# Patient Record
Sex: MALE | Race: WHITE | URBAN - METROPOLITAN AREA
[De-identification: names, ages, dates, MRNs, and addresses within clinical notes are randomized per-mention and may not be internally consistent; named-entity substitution may affect disease eponyms.]

---

## 2019-01-01 ENCOUNTER — POST-OPERATIVE VISIT (OUTPATIENT)
Dept: URBAN - METROPOLITAN AREA CLINIC 29 | Facility: CLINIC | Age: 74
End: 2019-01-01

## 2019-01-01 PROCEDURE — 99024 POSTOP FOLLOW-UP VISIT: CPT | Performed by: OPHTHALMOLOGY

## 2019-01-01 RX ORDER — PREDNISOLONE ACETATE 10 MG/ML
1 % SUSPENSION/ DROPS OPHTHALMIC
Qty: 10 | Refills: 3 | Status: INACTIVE
Start: 2019-01-01 | End: 2020-01-01

## 2019-01-01 ASSESSMENT — INTRAOCULAR PRESSURE
OD: 14
OS: 13

## 2019-05-03 ENCOUNTER — OFFICE VISIT (OUTPATIENT)
Dept: URBAN - METROPOLITAN AREA CLINIC 29 | Facility: CLINIC | Age: 74
End: 2019-05-03
Payer: MEDICARE

## 2019-05-03 PROCEDURE — 92004 COMPRE OPH EXAM NEW PT 1/>: CPT | Performed by: OPHTHALMOLOGY

## 2019-05-03 PROCEDURE — 92133 CPTRZD OPH DX IMG PST SGM ON: CPT | Performed by: OPHTHALMOLOGY

## 2019-05-03 PROCEDURE — 92020 GONIOSCOPY: CPT | Performed by: OPHTHALMOLOGY

## 2019-05-03 PROCEDURE — 76514 ECHO EXAM OF EYE THICKNESS: CPT | Performed by: OPHTHALMOLOGY

## 2019-05-03 RX ORDER — BRIMONIDINE TARTRATE, TIMOLOL MALEATE 2; 5 MG/ML; MG/ML
SOLUTION/ DROPS OPHTHALMIC
Qty: 5 | Refills: 10 | Status: INACTIVE
Start: 2019-05-03 | End: 2019-05-06

## 2019-05-03 RX ORDER — NETARSUDIL AND LATANOPROST OPHTHALMIC SOLUTION, 0.02%/0.005% .2; .05 MG/ML; MG/ML
SOLUTION/ DROPS OPHTHALMIC; TOPICAL
Qty: 1 | Refills: 5 | Status: INACTIVE
Start: 2019-05-03 | End: 2019-06-18

## 2019-05-03 ASSESSMENT — INTRAOCULAR PRESSURE
OD: 20
OS: 22

## 2019-05-03 NOTE — IMPRESSION/PLAN
Impression: Primary open-angle glaucoma, bilateral, moderate stage: H40.1132 OU. IOP borderline ou -
ONH large CD ratio ou - average CCT's ou - Plan: Discussed diagnosis, explained and understood by patient. Discussed IOP/ONH/Glaucoma management and risks. OCT ordered, performed and reviewed. start Rocklatan OS qhs. sample given. start combigan bid OD and continue combigan bid OS. Discussed side effects of glaucoma meds. Will continue to monitor condition and symptoms.

## 2019-06-18 ENCOUNTER — OFFICE VISIT (OUTPATIENT)
Dept: URBAN - METROPOLITAN AREA CLINIC 29 | Facility: CLINIC | Age: 74
End: 2019-06-18
Payer: MEDICARE

## 2019-06-18 PROCEDURE — 99213 OFFICE O/P EST LOW 20 MIN: CPT | Performed by: OPHTHALMOLOGY

## 2019-06-18 PROCEDURE — 92083 EXTENDED VISUAL FIELD XM: CPT | Performed by: OPHTHALMOLOGY

## 2019-06-18 RX ORDER — NETARSUDIL AND LATANOPROST OPHTHALMIC SOLUTION, 0.02%/0.005% .2; .05 MG/ML; MG/ML
SOLUTION/ DROPS OPHTHALMIC; TOPICAL
Qty: 1 | Refills: 11 | Status: INACTIVE
Start: 2019-06-18 | End: 2019-10-24

## 2019-06-18 ASSESSMENT — INTRAOCULAR PRESSURE
OS: 19
OD: 13

## 2019-06-18 NOTE — IMPRESSION/PLAN
Impression: Primary open-angle glaucoma, bilateral, moderate stage: H40.1132 OU. average CCT's ou - IOP doing well ou with current meds -
ONH stable ou - Plan: Discussed diagnosis, explained and understood by patient. Discussed IOP/ONH/Glaucoma management and risks. VF ordered, performed and reviewed results with patient. Continue Rocklatan OS qhs and combigan bid OU. Will continue to monitor condition and symptoms.

## 2019-08-23 ENCOUNTER — OFFICE VISIT (OUTPATIENT)
Dept: URBAN - METROPOLITAN AREA CLINIC 29 | Facility: CLINIC | Age: 74
End: 2019-08-23
Payer: MEDICARE

## 2019-08-23 PROCEDURE — 99213 OFFICE O/P EST LOW 20 MIN: CPT | Performed by: OPHTHALMOLOGY

## 2019-08-23 RX ORDER — PREDNISOLONE ACETATE 10 MG/ML
1 % SUSPENSION/ DROPS OPHTHALMIC
Qty: 5 | Refills: 0 | Status: INACTIVE
Start: 2019-08-23 | End: 2019-09-10

## 2019-08-23 ASSESSMENT — INTRAOCULAR PRESSURE
OS: 21
OD: 16

## 2019-08-23 NOTE — IMPRESSION/PLAN
Impression: Primary open-angle glaucoma, bilateral, moderate stage: H40.1132 OU. - IOP OD doing well - 
average CCT's ou - IOP OS borderline - 
ONH stable ou - Plan: Discussed diagnosis, explained and understood by patient. Discussed IOP/ONH/Glaucoma management and risks. Continue Rocklatan OS qhs and combigan bid OU. Recommends ALT OS to lower IOP. Discussed RBA's and laser procedure. RL=2 Patient elects ALT OS. start prednisolone OS qid x 7 days after laser. Will continue to monitor condition and symptoms.

## 2019-09-04 ENCOUNTER — SURGERY (OUTPATIENT)
Dept: URBAN - METROPOLITAN AREA SURGERY 11 | Facility: SURGERY | Age: 74
End: 2019-09-04
Payer: MEDICARE

## 2019-09-04 PROCEDURE — 65855 TRABECULOPLASTY LASER SURG: CPT | Performed by: OPHTHALMOLOGY

## 2019-09-10 ENCOUNTER — POST-OPERATIVE VISIT (OUTPATIENT)
Dept: URBAN - METROPOLITAN AREA CLINIC 29 | Facility: CLINIC | Age: 74
End: 2019-09-10

## 2019-09-10 PROCEDURE — 99024 POSTOP FOLLOW-UP VISIT: CPT | Performed by: OPTOMETRIST

## 2019-09-10 RX ORDER — TIMOLOL MALEATE 2.5 MG/ML
0.25 % SOLUTION/ DROPS OPHTHALMIC
Qty: 0 | Refills: 0 | Status: INACTIVE
Start: 2019-09-10 | End: 2019-09-10

## 2019-09-10 ASSESSMENT — INTRAOCULAR PRESSURE
OD: 15
OS: 20

## 2019-10-15 ENCOUNTER — OFFICE VISIT (OUTPATIENT)
Dept: URBAN - METROPOLITAN AREA CLINIC 29 | Facility: CLINIC | Age: 74
End: 2019-10-15
Payer: MEDICARE

## 2019-10-15 DIAGNOSIS — H40.1132 PRIMARY OPEN-ANGLE GLAUCOMA, BILATERAL, MODERATE STAGE: Primary | ICD-10-CM

## 2019-10-15 PROCEDURE — 99213 OFFICE O/P EST LOW 20 MIN: CPT | Performed by: OPHTHALMOLOGY

## 2019-10-15 RX ORDER — OFLOXACIN 3 MG/ML
0.3 % SOLUTION/ DROPS OPHTHALMIC
Qty: 1 | Refills: 0 | Status: INACTIVE
Start: 2019-10-15 | End: 2019-10-24

## 2019-10-15 RX ORDER — PREDNISOLONE ACETATE 10 MG/ML
1 % SUSPENSION/ DROPS OPHTHALMIC
Qty: 10 | Refills: 3 | Status: INACTIVE
Start: 2019-10-15 | End: 2019-10-24

## 2019-10-15 ASSESSMENT — INTRAOCULAR PRESSURE
OD: 16
OS: 22

## 2019-10-15 NOTE — IMPRESSION/PLAN
Impression: Primary open-angle glaucoma, bilateral, moderate stage: H40.1132 OU. - IOP OD doing well -  average CCT's ou -
 IOP OS still too high after ALT -
- S/P ALT OS 9/4/19 -
IOP OD doing well - Plan: Discussed diagnosis, explained and understood by patient. Discussed IOP/ONH/Glaucoma management and risks. Continue Rocklatan OS qhs and combigan bid OU. discussed CPC laser vs Trabeculectomy OS to lower IOP -
Patient elects Trabeculectomy OS to lower IOP. RL=2
discussed RBA's including bleeding, infection, loss of eye or sight. start ofloxacin OS qid x 7 days - start 1 day before surgery. 
start prednisolone q2h OS after surgery -

## 2019-10-23 ENCOUNTER — SURGERY (OUTPATIENT)
Dept: URBAN - METROPOLITAN AREA SURGERY 11 | Facility: SURGERY | Age: 74
End: 2019-10-23
Payer: MEDICARE

## 2019-10-23 PROCEDURE — 66170 GLAUCOMA SURGERY: CPT | Performed by: OPHTHALMOLOGY

## 2019-10-24 ENCOUNTER — POST-OPERATIVE VISIT (OUTPATIENT)
Dept: URBAN - METROPOLITAN AREA CLINIC 29 | Facility: CLINIC | Age: 74
End: 2019-10-24

## 2019-10-24 PROCEDURE — 99024 POSTOP FOLLOW-UP VISIT: CPT | Performed by: OPTOMETRIST

## 2019-10-24 RX ORDER — OFLOXACIN 3 MG/ML
0.3 % SOLUTION/ DROPS OPHTHALMIC
Qty: 1 | Refills: 0 | Status: INACTIVE
Start: 2019-10-24 | End: 2019-10-31

## 2019-10-24 RX ORDER — PREDNISOLONE ACETATE 10 MG/ML
1 % SUSPENSION/ DROPS OPHTHALMIC
Qty: 10 | Refills: 3 | Status: INACTIVE
Start: 2019-10-24 | End: 2019-01-01

## 2019-10-24 ASSESSMENT — INTRAOCULAR PRESSURE
OS: 38
OD: 16
OS: 17

## 2019-11-01 ENCOUNTER — POST-OPERATIVE VISIT (OUTPATIENT)
Dept: URBAN - METROPOLITAN AREA CLINIC 29 | Facility: CLINIC | Age: 74
End: 2019-11-01

## 2019-11-01 PROCEDURE — 99024 POSTOP FOLLOW-UP VISIT: CPT | Performed by: OPHTHALMOLOGY

## 2019-11-01 RX ORDER — BRIMONIDINE TARTRATE, TIMOLOL MALEATE 2; 5 MG/ML; MG/ML
SOLUTION/ DROPS OPHTHALMIC
Qty: 15 | Refills: 10 | Status: INACTIVE
Start: 2019-11-01 | End: 2020-01-01

## 2019-11-01 ASSESSMENT — INTRAOCULAR PRESSURE
OD: 16
OS: 18

## 2019-11-08 ENCOUNTER — POST-OPERATIVE VISIT (OUTPATIENT)
Dept: URBAN - METROPOLITAN AREA CLINIC 29 | Facility: CLINIC | Age: 74
End: 2019-11-08

## 2019-11-08 PROCEDURE — 99024 POSTOP FOLLOW-UP VISIT: CPT | Performed by: OPTOMETRIST

## 2019-11-08 ASSESSMENT — INTRAOCULAR PRESSURE
OD: 16
OS: 14

## 2019-11-22 ENCOUNTER — POST-OPERATIVE VISIT (OUTPATIENT)
Dept: URBAN - METROPOLITAN AREA CLINIC 29 | Facility: CLINIC | Age: 74
End: 2019-11-22

## 2019-11-22 DIAGNOSIS — Z09 ENCNTR FOR F/U EXAM AFT TRTMT FOR COND OTH THAN MALIG NEOPLM: Primary | ICD-10-CM

## 2019-11-22 PROCEDURE — 99024 POSTOP FOLLOW-UP VISIT: CPT | Performed by: OPHTHALMOLOGY

## 2019-11-22 ASSESSMENT — INTRAOCULAR PRESSURE
OD: 17
OS: 18

## 2020-01-01 ENCOUNTER — POST-OPERATIVE VISIT (OUTPATIENT)
Dept: URBAN - METROPOLITAN AREA CLINIC 29 | Facility: CLINIC | Age: 75
End: 2020-01-01
Payer: MEDICARE

## 2020-01-01 ENCOUNTER — OFFICE VISIT (OUTPATIENT)
Dept: URBAN - METROPOLITAN AREA CLINIC 29 | Facility: CLINIC | Age: 75
End: 2020-01-01

## 2020-01-01 DIAGNOSIS — H40.1111 PRIMARY OPEN-ANGLE GLAUCOMA, RIGHT EYE, MILD STAGE: ICD-10-CM

## 2020-01-01 DIAGNOSIS — H25.813 COMBINED FORMS OF AGE-RELATED CATARACT, BILATERAL: ICD-10-CM

## 2020-01-01 DIAGNOSIS — H40.1123 PRIMARY OPEN-ANGLE GLAUCOMA, LEFT EYE, SEVERE STAGE: Primary | ICD-10-CM

## 2020-01-01 DIAGNOSIS — H40.1122 PRIMARY OPEN-ANGLE GLAUCOMA, LEFT EYE, MODERATE STAGE: ICD-10-CM

## 2020-01-01 DIAGNOSIS — H40.1131 PRIMARY OPEN-ANGLE GLAUCOMA, BILATERAL, MILD STAGE: ICD-10-CM

## 2020-01-01 PROCEDURE — 92012 INTRM OPH EXAM EST PATIENT: CPT | Performed by: OPHTHALMOLOGY

## 2020-01-01 PROCEDURE — 99024 POSTOP FOLLOW-UP VISIT: CPT | Performed by: OPHTHALMOLOGY

## 2020-01-01 PROCEDURE — 92014 COMPRE OPH EXAM EST PT 1/>: CPT | Performed by: OPHTHALMOLOGY

## 2020-01-01 PROCEDURE — 99024 POSTOP FOLLOW-UP VISIT: CPT | Performed by: OPTOMETRIST

## 2020-01-01 ASSESSMENT — INTRAOCULAR PRESSURE
OD: 17
OD: 15
OD: 15
OS: 11
OS: 12
OS: 12
OS: 11
OS: 12
OD: 16
OD: 14

## 2020-01-21 NOTE — IMPRESSION/PLAN
Impression: Primary open-angle glaucoma, left eye, moderate stage: D99.8067.
s/p trab OS Fleming Halo) 10/2019 Plan: Pt has Glaucoma OS>OD      Pachs:  581/578    Today's IOP :  16/12      // Tmax & date :  20, 45 Target IOP low to mid teens Pt denies Fhx of Glaucoma Right eye is the better seeing eye HVF (6/18/2019) OD: nonpattern loss OS: nasal and superior scotoma C/D:  .8-.7 / .9-.7 OCT: 65, 51 Pt denies Sulfa Allergy   // Pt denies Lung /Heart dx Pt is currently using : Combigan BID OD, PF QID OS only Previously used medications : Latanoprost QHS OU Plan :
1. Continue Combigan BID OD. 2. Decrease Pred to TID OD for 2 weeks then BID until next appointment. Discussed details about Glaucoma and that without proper control of pressures irreversible blindness can occur. Patient understands risks. Emphasize compliance with drop and without compliance vision loss progression can occur. 3. Trab functioning well with excellent low lying elevated bleb 4. Return in 1 month for FU.

## 2020-01-21 NOTE — IMPRESSION/PLAN
Impression: Primary open-angle glaucoma, right eye, mild stage: H40.1111. Plan: Pt has Glaucoma OS>OD   Pachs: 581/578     Today's IOP :  16/12        // Tmax & date :   Target IOP low to mid teens Pt denies Fhx of Glaucoma Right eye is the better seeing eye HVF (2019) OD: nonpattern loss  OS: Superior Nasal Scotoma C/D:  .8-.7/ .9-.7 OCT: RNFL av, 51 Pt denies Sulfa Allergy   // Pt denies Lung /Heart dx Pt is currently using : Combigan BID OD, Pred QID OS Previously used medications : Latanoprost
Plan :
1. Continue Combigan BID OD. 2. Continue PF TID OS x 2 weeks then BID until return visit 3. Trab functioning well with excellent low lying elevated bleb 4. Discussed details about Glaucoma and that without proper control of pressures irreversible blindness can occur. Patient understands risks. Emphasize compliance with drop and without compliance vision loss progression can occur.

## 2020-02-18 NOTE — IMPRESSION/PLAN
Impression: Primary open-angle glaucoma, right eye, mild stage: H40.1111.  Plan: see left eye glc diagnosis

## 2020-02-18 NOTE — IMPRESSION/PLAN
Impression: Primary open-angle glaucoma, left eye, moderate stage: N37.1740.
s/p trab OS Leo Zelaya) 10/2019 Plan: Pt has Glaucoma OS>OD      Pachs:  581/578    Today's IOP :  16/11      // Tmax & date :  20, 45 Target IOP low to mid teens Pt denies Fhx of Glaucoma Right eye is the better seeing eye HVF (6/18/2019) OD: nonpattern loss OS: nasal and superior scotoma C/D:  .8-.7 / .9-.7 OCT: 65, 51 Pt denies Sulfa Allergy   // Pt denies Lung /Heart dx Pt is currently using : Prednisolone 1% BID OS & Combigan BID OD Previously used medications : Latanoprost QHS OU Plan :
1. Continue Combigan BID OD. 2. Decrease Pred to Q OD for 2 weeks then discontinue 3. Discussed details about Glaucoma and that without proper control of pressures irreversible blindness can occur. Patient understands risks. Emphasize compliance with drop and without compliance vision loss progression can occur. 4. Trab functioning well with excellent low lying elevated bleb 5. Return in 1 month for FU.

## 2020-03-17 NOTE — IMPRESSION/PLAN
Impression: Primary open-angle glaucoma, left eye, moderate stage: X76.6805.
s/p trab OS Christie Boer) 10/2019 Plan: Pt has Glaucoma OS>OD      Pachs:  581/578    Today's IOP :  14, 11      // Tmax & date :  20, 45 Target IOP low to mid teens Pt denies Fhx of Glaucoma Right eye is the better seeing eye HVF (6/18/2019) OD: non patterned loss OS: nasal and superior scotoma C/D:  .8-.7 / .9-.7 OCT: 65, 51 Pt denies Sulfa Allergy   // Pt denies Lung /Heart dx Pt is currently using : Prednisolone 1% BID OS & Combigan BID OD Previously used medications : Latanoprost QHS OU Plan :
1. Continue Combigan BID OD. 2. Per patient was using Pred BID - BEGIN TAPER to QD for 1 week then STOP. 3. Trab functioning well with excellent low lying elevated bleb 4. Return in 1 month for FU. Discussed details about Glaucoma and that without proper control of pressures irreversible blindness can occur. Patient understands risks. Emphasize compliance with drop and without compliance vision loss progression can occur.

## 2020-09-01 NOTE — IMPRESSION/PLAN
Impression: Primary open-angle glaucoma, left eye, moderate stage: V01.8788.
s/p trab OS Kite Tucker) 10/2019 Plan: Pt has Glaucoma OS>OD      Pachs:  581/578    Today's IOP :  15, 12    // Tmax & date :  20, 45 Target IOP low to mid teens Pt denies Fhx of Glaucoma Right eye is the better seeing eye HVF (6/18/2019) OD: non patterned loss OS: nasal and superior scotoma C/D:  .8-.7 / .9-.7 OCT: 65, 51 Pt denies Sulfa Allergy   // Pt denies Lung /Heart dx Pt is currently using : Combigan BID OD 1. Cont:
Combigan BID OU. - Per patient has been using QD 2. Patient is doing well ; Will continue to monitor. 3. Return in 3 months for IOP check, VF and MRx and Glare 4.  DFE today due to decline in [place] OS - most likely 2/2 cataract - No signs of retinal pathology or advancement in glc OS Awaiting children in AM